# Patient Record
Sex: FEMALE | Race: WHITE | NOT HISPANIC OR LATINO | URBAN - METROPOLITAN AREA
[De-identification: names, ages, dates, MRNs, and addresses within clinical notes are randomized per-mention and may not be internally consistent; named-entity substitution may affect disease eponyms.]

---

## 2025-01-02 ENCOUNTER — OFFICE VISIT (OUTPATIENT)
Dept: URGENT CARE | Age: 20
End: 2025-01-02
Payer: COMMERCIAL

## 2025-01-02 VITALS
WEIGHT: 110 LBS | DIASTOLIC BLOOD PRESSURE: 73 MMHG | HEART RATE: 84 BPM | BODY MASS INDEX: 20.12 KG/M2 | RESPIRATION RATE: 21 BRPM | SYSTOLIC BLOOD PRESSURE: 105 MMHG | TEMPERATURE: 98.2 F | OXYGEN SATURATION: 98 %

## 2025-01-02 DIAGNOSIS — H93.13 BILATERAL TINNITUS: Primary | ICD-10-CM

## 2025-01-02 PROCEDURE — 1036F TOBACCO NON-USER: CPT | Performed by: NURSE PRACTITIONER

## 2025-01-02 PROCEDURE — 99212 OFFICE O/P EST SF 10 MIN: CPT | Performed by: NURSE PRACTITIONER

## 2025-01-02 NOTE — PROGRESS NOTES
Subjective   Patient ID: Sushma Harper is a 19 y.o. female. They present today with a chief complaint of ringing in both ears (Patient here with ringing in both ears x 4 days).    History of Present Illness  HPI 19-year-old female presenting today for ringing in both ears that has been ongoing for about 4 days.  She does notice that it is more prevalent or louder when it is quiet.  Overall it has been pretty consistent.  She denies any recent head trauma or ear trauma.  Denies any recent exposure to loud noises such as a concert.    Past Medical History  Allergies as of 01/02/2025 - Reviewed 01/02/2025   Allergen Reaction Noted    Penicillins Dizziness 01/02/2025       (Not in a hospital admission)       No past medical history on file.    No past surgical history on file.     reports that she has never smoked. She has never been exposed to tobacco smoke. She has never used smokeless tobacco. She reports that she does not currently use alcohol. She reports that she does not use drugs.    Review of Systems  Review of Systems   HENT:  Positive for tinnitus.                                   Objective    Vitals:    01/02/25 1234   BP: 105/73   Pulse: 84   Resp: 21   Temp: 36.8 °C (98.2 °F)   SpO2: 98%   Weight: 49.9 kg (110 lb)     Patient's last menstrual period was 12/25/2024.    Physical Exam  Constitutional:       Appearance: Normal appearance.   HENT:      Right Ear: Tympanic membrane, ear canal and external ear normal.      Left Ear: Tympanic membrane, ear canal and external ear normal.   Cardiovascular:      Heart sounds: No murmur heard.  Skin:     General: Skin is warm.      Capillary Refill: Capillary refill takes less than 2 seconds.   Neurological:      Mental Status: She is alert and oriented to person, place, and time.   Psychiatric:         Mood and Affect: Mood normal.         Thought Content: Thought content normal.         Judgment: Judgment normal.         Procedures    Point of Care Test &  Imaging Results from this visit  No results found for this visit on 01/02/25.   No results found.    Diagnostic study results (if any) were reviewed by ALVARO Astorga.    Assessment/Plan   Allergies, medications, history, and pertinent labs/EKGs/Imaging reviewed by ALVRAO Astorga.     Medical Decision Making      Tinnitus  -Unclear etiology.  Otitis media, cerumen impaction ruled out.  No visible deformity or obstruction in ear to explain symptoms  -Also denies any trauma or exposure to loud noises  -Denies any other symptoms such as dizziness or headache  -ENT referral      Orders and Diagnoses  Diagnoses and all orders for this visit:  Bilateral tinnitus  -     Referral to ENT; Future      Medical Admin Record      Patient disposition: Home    Electronically signed by ALVARO Astorga  1:18 PM

## 2025-07-10 ENCOUNTER — OFFICE VISIT (OUTPATIENT)
Dept: OBSTETRICS AND GYNECOLOGY | Facility: CLINIC | Age: 20
End: 2025-07-10
Payer: COMMERCIAL

## 2025-07-10 VITALS
BODY MASS INDEX: 19.98 KG/M2 | SYSTOLIC BLOOD PRESSURE: 111 MMHG | DIASTOLIC BLOOD PRESSURE: 71 MMHG | WEIGHT: 108.6 LBS | HEIGHT: 62 IN

## 2025-07-10 DIAGNOSIS — N92.6 IRREGULAR MENSTRUAL CYCLE: Primary | ICD-10-CM

## 2025-07-10 DIAGNOSIS — Z30.09 BIRTH CONTROL COUNSELING: ICD-10-CM

## 2025-07-10 DIAGNOSIS — Z30.011 BCP (BIRTH CONTROL PILLS) INITIATION: ICD-10-CM

## 2025-07-10 PROCEDURE — 3008F BODY MASS INDEX DOCD: CPT

## 2025-07-10 PROCEDURE — 1036F TOBACCO NON-USER: CPT

## 2025-07-10 PROCEDURE — 99213 OFFICE O/P EST LOW 20 MIN: CPT

## 2025-07-10 PROCEDURE — 99203 OFFICE O/P NEW LOW 30 MIN: CPT

## 2025-07-10 RX ORDER — NORETHINDRONE ACETATE AND ETHINYL ESTRADIOL 1MG-20(21)
1 KIT ORAL DAILY
Qty: 84 TABLET | Refills: 1 | Status: SHIPPED | OUTPATIENT
Start: 2025-07-10 | End: 2025-12-25

## 2025-07-10 ASSESSMENT — ENCOUNTER SYMPTOMS
ABDOMINAL PAIN: 0
VOMITING: 0
SHORTNESS OF BREATH: 0
UNEXPECTED WEIGHT CHANGE: 0
NAUSEA: 0
FEVER: 0
DYSURIA: 0
LOSS OF SENSATION IN FEET: 0
OCCASIONAL FEELINGS OF UNSTEADINESS: 0
DEPRESSION: 0
FATIGUE: 0

## 2025-07-10 ASSESSMENT — PATIENT HEALTH QUESTIONNAIRE - PHQ9
SUM OF ALL RESPONSES TO PHQ9 QUESTIONS 1 & 2: 0
2. FEELING DOWN, DEPRESSED OR HOPELESS: NOT AT ALL
1. LITTLE INTEREST OR PLEASURE IN DOING THINGS: NOT AT ALL

## 2025-07-10 ASSESSMENT — PAIN SCALES - GENERAL: PAINLEVEL_OUTOF10: 0-NO PAIN

## 2025-07-10 NOTE — PROGRESS NOTES
"Subjective   Sushma Harper is a 19 y.o. female who is here for a routine GYN exam as a new patient establishing care.  - Previously saw Dr. Sharp in the past around 2019 for irregular menses; positive withdrawal bleeds with Provera at that time.  - Here today to discuss contraception options.  - States her menstrual cycles have been irregular since menarche at 12 years old.  Within a few years after menarche, she would go about 7 to 8 months without bleeding.  As of more recent, her cycle length tracked on her phone on average is about 50 days.  Menses will typically last 7 days duration without bothersome cramps.  LMP 25.  - She does admit that her cycles will be less frequent with weight loss or increased stress.  - She does notice that her menstrual cycles will be more regular when she increases her calories with associated weight gain.  Currently walks and runs for exercise.   - Denies any acne or hirsutism; denies any hair loss or hair thinning.  - She does report a family history of PCOS (mom).  - She is most interested in OCP for contraception.  Denies any history of hypertension, liver disease, vascular disease or blood clots, smoking, or migraines with auras.      OB History          0    Para   0    Term   0       0    AB   0    Living   0         SAB   0    IAB   0    Ectopic   0    Multiple   0    Live Births   0                  Review of Systems   Constitutional:  Negative for fatigue, fever and unexpected weight change.   Respiratory:  Negative for shortness of breath.    Gastrointestinal:  Negative for abdominal pain, nausea and vomiting.   Genitourinary:  Positive for menstrual problem. Negative for dysuria, pelvic pain and vaginal discharge.       Objective   /71   Ht 1.575 m (5' 2\")   Wt 49.3 kg (108 lb 9.6 oz)   LMP 2025 (Approximate)   BMI 19.86 kg/m²        General:   Alert and oriented, in no acute distress   Psych Normal affect. Normal mood.  "     Assessment/Plan   Diagnoses and all orders for this visit:  Irregular menstrual cycle  -     FSH & LH; Future  -     TSH with reflex to Free T4 if abnormal; Future  -     Prolactin; Future  -     Testosterone,Free and Total; Future  -     US PELVIS TRANSABDOMINAL WITH TRANSVAGINAL; Future  - Plan to obtain pelvic ultrasound to assess ovaries for PCOS features and blood work to rule out hormonal abnormalities in relation to her irregular menstrual bleeding.  No immediate clinical signs or symptoms of PCOS despite her irregular bleeding patterns, however she does have a family history of PCOS.  We did discuss the impact low BMI, inadequate calorie/nutrition, and excessive or intensive exercising can have on menstrual regulation. She is interested in starting OCP for contraception.  Birth control counseling  -     norethindrone-e.estradioL-iron (Junel FE 1/20) 1 mg-20 mcg (21)/75 mg (7) tablet; Take 1 tablet by mouth once daily.  - Reviewed OCP in great depth including how to begin the pill pack, memory cues/daily dosing, and associated likely bleeding patterns.   - No contraindications to OCP use.   - She will plan to complete workup and then begin the pill pack with her upcoming menstruation.  - Encouraged regular condom use with sexual activity.   - Plan to follow-up in November for annual GYN exam with Pap smear and birth control follow-up.   BCP (birth control pills) initiation  -     norethindrone-e.estradioL-iron (Junel FE 1/20) 1 mg-20 mcg (21)/75 mg (7) tablet; Take 1 tablet by mouth once daily.    Latanya Williamson PA-C

## 2025-07-10 NOTE — PATIENT INSTRUCTIONS
Take your first pill of your pill pack on the Sunday following the first day of menstrual bleeding.

## 2025-07-15 ENCOUNTER — APPOINTMENT (OUTPATIENT)
Dept: RADIOLOGY | Facility: CLINIC | Age: 20
End: 2025-07-15
Payer: COMMERCIAL

## 2025-07-16 LAB
FSH SERPL-ACNC: 11.1 MIU/ML
LH SERPL-ACNC: 28.2 MIU/ML
PROLACTIN SERPL-MCNC: 7.7 NG/ML
TESTOST FREE SERPL-MCNC: NORMAL PG/ML
TESTOST SERPL-MCNC: NORMAL NG/DL
TSH SERPL-ACNC: 1.86 MIU/L

## 2025-07-21 LAB
FSH SERPL-ACNC: 11.1 MIU/ML
LH SERPL-ACNC: 28.2 MIU/ML
PROLACTIN SERPL-MCNC: 7.7 NG/ML
TESTOST FREE SERPL-MCNC: 5 PG/ML (ref 0.1–6.4)
TESTOST SERPL-MCNC: 39 NG/DL (ref 2–45)
TSH SERPL-ACNC: 1.86 MIU/L